# Patient Record
Sex: MALE | Race: WHITE | NOT HISPANIC OR LATINO
[De-identification: names, ages, dates, MRNs, and addresses within clinical notes are randomized per-mention and may not be internally consistent; named-entity substitution may affect disease eponyms.]

---

## 2017-06-04 ENCOUNTER — TRANSCRIPTION ENCOUNTER (OUTPATIENT)
Age: 22
End: 2017-06-04

## 2017-09-10 ENCOUNTER — TRANSCRIPTION ENCOUNTER (OUTPATIENT)
Age: 22
End: 2017-09-10

## 2017-11-25 ENCOUNTER — TRANSCRIPTION ENCOUNTER (OUTPATIENT)
Age: 22
End: 2017-11-25

## 2018-06-18 ENCOUNTER — TRANSCRIPTION ENCOUNTER (OUTPATIENT)
Age: 23
End: 2018-06-18

## 2018-07-15 ENCOUNTER — TRANSCRIPTION ENCOUNTER (OUTPATIENT)
Age: 23
End: 2018-07-15

## 2019-10-09 ENCOUNTER — TRANSCRIPTION ENCOUNTER (OUTPATIENT)
Age: 24
End: 2019-10-09

## 2020-06-09 ENCOUNTER — APPOINTMENT (OUTPATIENT)
Dept: OTOLARYNGOLOGY | Facility: CLINIC | Age: 25
End: 2020-06-09
Payer: COMMERCIAL

## 2020-06-09 VITALS — SYSTOLIC BLOOD PRESSURE: 119 MMHG | DIASTOLIC BLOOD PRESSURE: 82 MMHG

## 2020-06-09 VITALS
HEART RATE: 80 BPM | WEIGHT: 205 LBS | DIASTOLIC BLOOD PRESSURE: 116 MMHG | RESPIRATION RATE: 16 BRPM | HEIGHT: 71 IN | SYSTOLIC BLOOD PRESSURE: 147 MMHG | BODY MASS INDEX: 28.7 KG/M2

## 2020-06-09 VITALS — SYSTOLIC BLOOD PRESSURE: 124 MMHG | DIASTOLIC BLOOD PRESSURE: 88 MMHG

## 2020-06-09 DIAGNOSIS — Z78.9 OTHER SPECIFIED HEALTH STATUS: ICD-10-CM

## 2020-06-09 DIAGNOSIS — Z83.3 FAMILY HISTORY OF DIABETES MELLITUS: ICD-10-CM

## 2020-06-09 DIAGNOSIS — C06.9 MALIGNANT NEOPLASM OF MOUTH, UNSPECIFIED: ICD-10-CM

## 2020-06-09 DIAGNOSIS — B20 HUMAN IMMUNODEFICIENCY VIRUS [HIV] DISEASE: ICD-10-CM

## 2020-06-09 DIAGNOSIS — H66.90 OTITIS MEDIA, UNSPECIFIED, UNSPECIFIED EAR: ICD-10-CM

## 2020-06-09 DIAGNOSIS — Z82.2 FAMILY HISTORY OF DEAFNESS AND HEARING LOSS: ICD-10-CM

## 2020-06-09 DIAGNOSIS — Z80.3 FAMILY HISTORY OF MALIGNANT NEOPLASM OF BREAST: ICD-10-CM

## 2020-06-09 PROCEDURE — 92557 COMPREHENSIVE HEARING TEST: CPT

## 2020-06-09 PROCEDURE — 92567 TYMPANOMETRY: CPT

## 2020-06-09 PROCEDURE — 99203 OFFICE O/P NEW LOW 30 MIN: CPT | Mod: 25

## 2020-06-09 RX ORDER — CETIRIZINE HYDROCHLORIDE 10 MG/1
10 TABLET, COATED ORAL
Refills: 0 | Status: ACTIVE | COMMUNITY

## 2020-06-09 RX ORDER — L. ACIDOPHILUS/BIFID. ANIMALIS 10B CELL
CAPSULE ORAL
Refills: 0 | Status: ACTIVE | COMMUNITY

## 2020-06-09 RX ORDER — BICTEGRAVIR SODIUM, EMTRICITABINE, AND TENOFOVIR ALAFENAMIDE FUMARATE 50; 200; 25 MG/1; MG/1; MG/1
50-200-25 TABLET ORAL
Refills: 0 | Status: ACTIVE | COMMUNITY

## 2020-06-09 NOTE — HISTORY OF PRESENT ILLNESS
[de-identified] : Mr. KEITA is a 25 year male who presents with a pulsation sensation in his left ear which is intermittent for the last 3 weeks.  He gives a history of having recurrent ear infections as a child  He reports that he was in [place] and spoke to an MD via telemedicine.  He was given antibiotics which did not improve the situation by an urgent care center which also did not improve his symptoms.  He was seen in an ER 5 days ago (in [place]) and reports that he was prescribed Bactrim which has not improved the situation.  He reports that his hearing is unaffected, and denies any pain. [Hearing Loss] : no hearing loss [Ear Fullness] : no ear fullness

## 2020-06-09 NOTE — PHYSICAL EXAM
[Midline] : trachea located in midline position [de-identified] : No bruits [de-identified] : 2+ [Normal] : auscultation of heart is normal [de-identified] : No murmurs

## 2020-06-09 NOTE — ASSESSMENT
[FreeTextEntry1] : Pt's pulsing in his ears may be related to transient elevated BP.  Pt to get BP cuff and monitor his symptoms at home.  If his pressure remains elevated he will see an internist and discuss management.  If he continues to have pulsatile tinnitus and his BP is normal we will send him for an MRA of the neck.

## 2020-08-15 ENCOUNTER — APPOINTMENT (OUTPATIENT)
Dept: OTOLARYNGOLOGY | Facility: CLINIC | Age: 25
End: 2020-08-15
Payer: COMMERCIAL

## 2020-08-15 VITALS
BODY MASS INDEX: 27.58 KG/M2 | DIASTOLIC BLOOD PRESSURE: 94 MMHG | TEMPERATURE: 98.8 F | HEART RATE: 87 BPM | HEIGHT: 71 IN | WEIGHT: 197 LBS | SYSTOLIC BLOOD PRESSURE: 148 MMHG

## 2020-08-15 DIAGNOSIS — H69.82 OTHER SPECIFIED DISORDERS OF EUSTACHIAN TUBE, LEFT EAR: ICD-10-CM

## 2020-08-15 DIAGNOSIS — H61.23 IMPACTED CERUMEN, BILATERAL: ICD-10-CM

## 2020-08-15 DIAGNOSIS — H93.292 OTHER ABNORMAL AUDITORY PERCEPTIONS, LEFT EAR: ICD-10-CM

## 2020-08-15 DIAGNOSIS — H93.A9 PULSATILE TINNITUS, UNSPECIFIED EAR: ICD-10-CM

## 2020-08-15 PROCEDURE — 99214 OFFICE O/P EST MOD 30 MIN: CPT | Mod: 25

## 2020-08-15 PROCEDURE — 92504 EAR MICROSCOPY EXAMINATION: CPT

## 2020-08-15 NOTE — HISTORY OF PRESENT ILLNESS
[de-identified] : 25M with left pulsatile tinnitus present for months, initially more severe but now more mild, intermittent, occurring every other day and lasting minutes.  Does not coincide with heartbeat, sometimes described as whooshing sound, does not change with head position.  Reports multiple prior tubes as a child.

## 2020-08-15 NOTE — REASON FOR VISIT
[Initial Consultation] : an initial consultation for [FreeTextEntry2] : pulsing in his left ear [FreeTextEntry1] : Referred by Dr. Nichols

## 2020-08-15 NOTE — DATA REVIEWED
[de-identified] : I personally reviewed an audiogram, which shows normal hearing bilaterally with type A tympanograms bilaterally.  Word discrimination scores are excellent bilaterally.\par  [de-identified] : I personally reviewed MRA head/neck images/report which was normal.

## 2020-10-24 ENCOUNTER — TRANSCRIPTION ENCOUNTER (OUTPATIENT)
Age: 25
End: 2020-10-24

## 2021-03-01 ENCOUNTER — APPOINTMENT (OUTPATIENT)
Dept: COLORECTAL SURGERY | Facility: CLINIC | Age: 26
End: 2021-03-01
Payer: COMMERCIAL

## 2021-03-01 PROCEDURE — 99203 OFFICE O/P NEW LOW 30 MIN: CPT

## 2021-03-01 PROCEDURE — 99072 ADDL SUPL MATRL&STAF TM PHE: CPT

## 2021-03-01 NOTE — PHYSICAL EXAM
[Normal] : was normal [None] : there was no rectal mass  [Respiratory Effort] : normal respiratory effort [Normal Rate and Rhythm] : normal rate and rhythm [Calm] : calm [Excoriation] : no perianal excoriation [Gross Blood] : no gross blood [de-identified] : Soft, nontender, nondistended.  No mass or hernias appreciated. [de-identified] : Grade 1 internal hemorrhoids [de-identified] : Small left thrombosed external hemorrhoid which is resolving. [de-identified] : Well-appearing, in no distress [de-identified] : Normocephalic, atraumatic [de-identified] : Moves extremities without difficulty [de-identified] : Warm and dry [de-identified] : Alert and oriented x3

## 2021-03-01 NOTE — HISTORY OF PRESENT ILLNESS
[FreeTextEntry1] : 25-year-old male who presents for consultation for anal pain.  He reports a 1-1/2-week history of severe rectal pain.  No associated rectal bleeding.  He has a history of hemorrhoids off-and-on and was using over-the-counter medications with current episode but it did not seem to help and he was actually having a difficult time administering suppository.  He was seen by his primary doctor who started him on hydrocortisone suppositories and his symptoms have gradually subsided.  He has been having regular bowel movements without any constipation or diarrhea.  He has a history of an anal fissure and underwent sphincterotomy in 2014.  He has HIV infection but has never undergone anal Pap smear or high resolution anoscopy.  He was diagnosed about 3 years ago and is compliant with his medication.  He reports normal CD4 counts although recently, his viral load became detectable although a low number of per report.  \par \par

## 2021-03-01 NOTE — CONSULT LETTER
[Consult Letter:] : I had the pleasure of evaluating your patient, [unfilled]. [Please see my note below.] : Please see my note below. [Consult Closing:] : Thank you very much for allowing me to participate in the care of this patient.  If you have any questions, please do not hesitate to contact me. [Sincerely,] : Sincerely, [Dear  ___] : Dear  [unfilled], [FreeTextEntry3] : Levon Ambriz MD\par

## 2021-03-01 NOTE — PROCEDURE
[FreeTextEntry1] : Unremarkable anoscopy.  Small resolving left thrombosed external hemorrhoid noted.

## 2021-08-16 ENCOUNTER — APPOINTMENT (OUTPATIENT)
Dept: COLORECTAL SURGERY | Facility: CLINIC | Age: 26
End: 2021-08-16
Payer: COMMERCIAL

## 2021-08-16 VITALS
WEIGHT: 197 LBS | SYSTOLIC BLOOD PRESSURE: 147 MMHG | DIASTOLIC BLOOD PRESSURE: 95 MMHG | HEIGHT: 71 IN | TEMPERATURE: 96.6 F | BODY MASS INDEX: 27.58 KG/M2 | HEART RATE: 99 BPM | RESPIRATION RATE: 16 BRPM

## 2021-08-16 DIAGNOSIS — K64.5 PERIANAL VENOUS THROMBOSIS: ICD-10-CM

## 2021-08-16 PROCEDURE — 99213 OFFICE O/P EST LOW 20 MIN: CPT

## 2021-08-16 NOTE — PHYSICAL EXAM
[Excoriation] : no perianal excoriation [Normal] : was normal [None] : there was no rectal mass  [Respiratory Effort] : normal respiratory effort [Calm] : calm [de-identified] : Small thrombosed right external hemorrhoid [de-identified] : Well-appearing, in no distress [de-identified] : Normocephalic, atraumatic [de-identified] : Moves extremities without difficulty [de-identified] : Warm and dry [de-identified] : Alert and oriented x3

## 2021-08-16 NOTE — HISTORY OF PRESENT ILLNESS
[FreeTextEntry1] : 26-year-old male who presents for evaluation of a thrombosed external hemorrhoid.  He developed it about a month ago while under a lot of stress.  He has been performing sitz bath's and increase the fiber in his diet which resulted in improvement in the thrombosed hemorrhoid.  He has no pain and the hemorrhoid decreased in size.  However over the last couple of days, he noticed increase in the size and it still has not resolved.  He wanted it evaluated to make sure there is no other underlying pathology.

## 2021-08-30 ENCOUNTER — TRANSCRIPTION ENCOUNTER (OUTPATIENT)
Age: 26
End: 2021-08-30

## 2021-09-08 ENCOUNTER — TRANSCRIPTION ENCOUNTER (OUTPATIENT)
Age: 26
End: 2021-09-08

## 2021-09-24 ENCOUNTER — TRANSCRIPTION ENCOUNTER (OUTPATIENT)
Age: 26
End: 2021-09-24

## 2021-12-10 ENCOUNTER — TRANSCRIPTION ENCOUNTER (OUTPATIENT)
Age: 26
End: 2021-12-10

## 2021-12-15 ENCOUNTER — TRANSCRIPTION ENCOUNTER (OUTPATIENT)
Age: 26
End: 2021-12-15

## 2022-03-15 ENCOUNTER — TRANSCRIPTION ENCOUNTER (OUTPATIENT)
Age: 27
End: 2022-03-15

## 2022-03-17 ENCOUNTER — APPOINTMENT (OUTPATIENT)
Dept: GASTROENTEROLOGY | Facility: CLINIC | Age: 27
End: 2022-03-17
Payer: COMMERCIAL

## 2022-03-17 VITALS
OXYGEN SATURATION: 97 % | HEART RATE: 102 BPM | TEMPERATURE: 96.4 F | HEIGHT: 71 IN | BODY MASS INDEX: 28.98 KG/M2 | SYSTOLIC BLOOD PRESSURE: 152 MMHG | WEIGHT: 207 LBS | DIASTOLIC BLOOD PRESSURE: 96 MMHG

## 2022-03-17 DIAGNOSIS — K59.09 OTHER CONSTIPATION: ICD-10-CM

## 2022-03-17 DIAGNOSIS — R10.13 EPIGASTRIC PAIN: ICD-10-CM

## 2022-03-17 DIAGNOSIS — R12 HEARTBURN: ICD-10-CM

## 2022-03-17 PROCEDURE — 99204 OFFICE O/P NEW MOD 45 MIN: CPT

## 2022-03-17 RX ORDER — OMEPRAZOLE 40 MG/1
40 CAPSULE, DELAYED RELEASE ORAL DAILY
Qty: 30 | Refills: 1 | Status: ACTIVE | COMMUNITY
Start: 2022-03-17 | End: 1900-01-01

## 2022-03-18 PROBLEM — R10.13 ACUTE EPIGASTRIC PAIN: Status: ACTIVE | Noted: 2022-03-17

## 2022-03-18 PROBLEM — K59.09 CHRONIC CONSTIPATION: Status: ACTIVE | Noted: 2022-03-17

## 2022-03-18 LAB — UREA BREATH TEST QL: NEGATIVE

## 2022-03-18 RX ORDER — BLOOD PRESSURE TEST KIT-LARGE
KIT MISCELLANEOUS
Qty: 1 | Refills: 0 | Status: ACTIVE | COMMUNITY
Start: 2022-02-09

## 2022-03-18 RX ORDER — VALACYCLOVIR 1 G/1
1 TABLET, FILM COATED ORAL
Qty: 10 | Refills: 0 | Status: ACTIVE | COMMUNITY
Start: 2022-02-14

## 2022-03-18 RX ORDER — SULFAMETHOXAZOLE AND TRIMETHOPRIM 400; 80 MG/1; MG/1
TABLET ORAL
Refills: 0 | Status: DISCONTINUED | COMMUNITY
End: 2022-03-18

## 2022-03-18 RX ORDER — BUPROPION HYDROCHLORIDE 150 MG/1
150 TABLET, EXTENDED RELEASE ORAL
Qty: 30 | Refills: 0 | Status: DISCONTINUED | COMMUNITY
Start: 2021-11-15

## 2022-03-18 NOTE — PHYSICAL EXAM
[General Appearance - Alert] : alert [General Appearance - In No Acute Distress] : in no acute distress [General Appearance - Well Nourished] : well nourished [Sclera] : the sclera and conjunctiva were normal [Outer Ear] : the ears and nose were normal in appearance [Neck Appearance] : the appearance of the neck was normal [Neck Cervical Mass (___cm)] : no neck mass was observed [Heart Rate And Rhythm] : heart rate was normal and rhythm regular [Bowel Sounds] : normal bowel sounds [Abdomen Soft] : soft [Abnormal Walk] : normal gait [Skin Color & Pigmentation] : normal skin color and pigmentation [Skin Turgor] : normal skin turgor [] : no rash [No Focal Deficits] : no focal deficits [Oriented To Time, Place, And Person] : oriented to person, place, and time [Impaired Insight] : insight and judgment were intact [Affect] : the affect was normal [FreeTextEntry1] : tenderness throughout abdomen

## 2022-03-18 NOTE — HISTORY OF PRESENT ILLNESS
[de-identified] : 26M with PMHx HIV +, anxiety, depression, bipolar disorder. ADHD and HTN referred by PCP for evaluation of epigastric pain. \par goes by "Sven"\par \par HPI- 3 weeks ago was eating unhealthy \par began to have upper abdominal pain for last 2 weeks, feels it most on movement and when takes a deep breath \par adjusted diet and upped fiber, symptoms a little bit better. pain was 6/7 out of 10, now 3/4 out of 10 \par went to urgent care and told can take Maalox over the counter. tried ronen seltzer chews and did not help at all. 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 none\par FHx- none \par Allergies- Augmentin \par ETOH- none\par Smoking- none \par Drugs- Cannibis \par Diet-\par \par Labs/stool tests- just had full panel of labs with PCP \par Breath tests- none \par Imaging- none \par EGD- ? 10 years ago \par Colonoscopy-  never

## 2022-03-18 NOTE — ASSESSMENT
[FreeTextEntry1] : 26M with PMHx HIV +, anxiety, depression, bipolar disorder. ADHD and HTN referred by PCP for evaluation of epigastric pain. \par \par Epigastric pain, improving \par - h. pylori breath test \par - retrieve all recent labs performed by PCP for review \par - abdominal ultrasound as patient felt general pain during abdominal exam \par - begin Omeprazole 40mg daily 30 minutes before breakfast x 2 months \par - possible this could be reflux, diet and lifestyle measures discussed such as allowing 3-4 hours after last meal before laying down, avoid triggers, adequate water intake\par - continue to work with therapist as eating disorder with restrictive eating can be contributing to symptoms\par - gut- brain modulation as stress is likely playing big part in symptoms\par \par Constipation\par -  abdominal XRAY with history of constipation, evaluate stool burden  \par - continue psyllium husk since improving BMs\par - add MiraLAX daily \par \par f/u with results and RTC in 2 months

## 2022-12-01 ENCOUNTER — NON-APPOINTMENT (OUTPATIENT)
Age: 27
End: 2022-12-01

## 2023-04-11 ENCOUNTER — NON-APPOINTMENT (OUTPATIENT)
Age: 28
End: 2023-04-11

## 2023-06-23 ENCOUNTER — NON-APPOINTMENT (OUTPATIENT)
Age: 28
End: 2023-06-23

## 2023-11-24 ENCOUNTER — NON-APPOINTMENT (OUTPATIENT)
Age: 28
End: 2023-11-24